# Patient Record
Sex: FEMALE | Race: WHITE | Employment: OTHER | URBAN - METROPOLITAN AREA
[De-identification: names, ages, dates, MRNs, and addresses within clinical notes are randomized per-mention and may not be internally consistent; named-entity substitution may affect disease eponyms.]

---

## 2022-04-17 ENCOUNTER — HOSPITAL ENCOUNTER (OUTPATIENT)
Age: 82
Discharge: HOME | End: 2022-04-17

## 2022-04-17 ENCOUNTER — HOSPITAL ENCOUNTER (EMERGENCY)
Age: 82
Discharge: ANOTHER HOSPITAL | End: 2022-04-17
Payer: MEDICARE

## 2022-04-17 ENCOUNTER — APPOINTMENT (OUTPATIENT)
Dept: CT IMAGING | Age: 82
End: 2022-04-17

## 2022-04-17 PROCEDURE — 99449 NTRPROF PH1/NTRNET/EHR 31/>: CPT | Performed by: PSYCHIATRY & NEUROLOGY

## 2022-04-17 NOTE — VIRTUAL HEALTH
1 hospitals Stroke and Vascular Neurology Consult for  [facility] Stroke Unit Stroke Alert through [address] @ 14:11  4/17/2022 4:43 PM  Pt Name: Jo Ann Storey  MRN: [id number]  [place]: 1940  Date of evaluation: 4/17/2022  Primary Care Physician: Shon Mas MD  Reason for Evaluation: Stroke Evaluation with Discussion with Ed or primary team with Telemedicine and stroke evaluation with Review of imaging and labs    Jo Ann Storey is a [de-identified] y.o. female who presents with last well 22:00 the night before. Lives with  who found her sleeping on couch since last night which is not usual for her. Increased expressive aphasia. Able to follow commands. Anxious. Post ictal vs new stroke. Not lying flat for scan and after telestroke evaluation, decision made for not sedating for the head ct prior to transport to [facility]    LKW: 22:00  NIH:  3    Allergies  has no allergies on file. Medications  Prior to Admission medications    Not on File    Scheduled Meds:  Continuous Infusions:  PRN Meds:.  Past Medical History   has no past medical history on file.   Social History  Social History     Socioeconomic History    Marital status:      Spouse name: Not on file    Number of children: Not on file    Years of education: Not on file    Highest education level: Not on file   Occupational History    Not on file   Tobacco Use    Smoking status: Not on file    Smokeless tobacco: Not on file   Substance and Sexual Activity    Alcohol use: Not on file    Drug use: Not on file    Sexual activity: Not on file   Other Topics Concern    Not on file   Social History Narrative    Not on file     Social Determinants of Health     Financial Resource Strain:     Difficulty of Paying Living Expenses: Not on file   Food Insecurity:     Worried About Running Out of Food in the Last Year: Not on file    Jahaira of Food in the Last Year: Not on file   Transportation Needs:     Lack of Transportation (Medical): Not on file    Lack of Transportation (Non-Medical): Not on file   Physical Activity:     Days of Exercise per Week: Not on file    Minutes of Exercise per Session: Not on file   Stress:     Feeling of Stress : Not on file   Social Connections:     Frequency of Communication with Friends and Family: Not on file    Frequency of Social Gatherings with Friends and Family: Not on file    Attends Anabaptist Services: Not on file    Active Member of [address] [place] [address] [employer] or Organizations: Not on file    Attends Club or Organization Meetings: Not on file    Marital Status: Not on file   Intimate Partner Violence:     Fear of Current or Ex-Partner: Not on file    Emotionally Abused: Not on file    Physically Abused: Not on file    Sexually Abused: Not on file   Housing Stability:     Unable to Pay for Housing in the Last Year: Not on file    Number of [place] in the Last Year: Not on file    Unstable Housing in the Last Year: Not on file     Family History  No family history on file. OBJECTIVE   BP Pulse    Resp  Rate N-normal  S-shallow  D-deep Monitor SpO2 O2    LPM BGL    Temp Pain   1250 122/68 135 14 N ST 98%        1305 121/74 131 18 N ST 99% RA                                                                                     Limited exam, expressive aphasia, moving all extremities    Pre-Morbid mRS: 1    Imaging:  Images were personally reviewed with VIZ. AI and PACS used to review images including:  CT brain without contrast: no hemorrhage. Noted atrophy      Assessment    [de-identified] y.o. female who presents with last well 22:00 the night before. Lives with  who found her sleeping on couch since last night which is not usual for her  Differential DDx:  1. Post ictal vs cva      Recommendations:  1. NIH 3  2. Recommend Inpatient Neurology Consult for further assessment and evaluation   3. cta head and neck  4.  Mri brain without anabell  5. Neurology workup and metabolic workup. . patient tachycardic      Discussed with Stroke team    At least 50 min of Telemedicine and time in conversation directly with ED staff and physician for the patient who is in imminent and life threatening deterioration without further treatment and evaluation. This Virtual Visit was conducted with patient's (and/or legal guardian's) consent, to provide telestroke consultation and necessary medical care. Time spent examining patient, reviewing the images personally, reviewing the chart, perform high complexity decision making and speaking with the nursing staff regarding recommendations      Florina Quinteros MD, MD   Stroke, Neurocritical Care And/or [address] [place] [address] Stroke [address] [address]  Electronically signed 4/17/2022 at 4:43 PM    Patient Location:  [facility] Stroke Unit    Provider Location ([facility]): [place], [place]    This virtual visit was conducted via interactive/real-time audio/video.

## 2022-04-17 NOTE — ED NOTES
..    4/17/2022 1:30 PM    Patient: Jimmy Dean, [de-identified] y.o., female Race: Caucasion  Patient Address: [address]  [address]  [address]  Incident Address:  [x]Same as patient address     [x] [facility]  [] [facility]  [] [facility] [place]   [] [facility]  [] [facility]  Patient Phone #: [phone]   Insurance: Payor: Cas Unger /  /  /   Eugenio Javier:  []  Yes   [x]  No  Emergency Contact: Extended Emergency Contact Information  Primary Emergency Contact: [address] Phone: [phone]  Relation: Spouse  MRN: [id number]  [facility]# [id number]  YOB: 1940  Primary Care Physician: Ramiro Dumas MD  Advance Directive: [x] Full Code   []DNR-CC   []DNR-CCA    MSU Crew: BOONE Garcia - CT Tech, DEEPAK Sow - Paramedic, Debi Antonio - KATY    Pt Transported To:  [] [place] [address]  [] [facility]  [] [facility]  [] [facility]  [x] [facility]  [] [facility]  [facility] [] [facility] [] [facility]    Was patient transported to closest facility? [x]Yes  []No (if No specify reason)   []   Patient/family request    []   Divert to specialist       Response Code   [] 2  [x] 3  []   Change  [] 2  [] 3   Transport Code   [x] 2  [] 3  []   Change  [] 2  [] 3     Mileage:  [address] [id number]   Total Miles 1.7     Call Received 4/17/2022 1218   Dispatched 4/17/2022 1222   Enroute 4/17/2022 1225   Arrived Scene 4/17/2022 1237   At Patient 4/17/2022 1241   Decision to Scan 4/17/2022 1245   Scan 4/17/2022 NA   Departed Scene 4/17/2022 [address] 4/17/2022 1306   Departed Hospital 4/17/2022 1330   In Service 4/17/2022 1330         Allergies  [] Updated/Reviewed by MSU  [x] Historical Data Only  [] Unavailable  has no allergies on file. Medications  [] Updated/Reviewed by MSU  [x] Historical Data Only  [] Unavailable  Prior to Admission medications    Not on File      Past Medical History  [] Updated/Reviewed by MSU  [x] Historical Data Only  [] Unavailable   has no past medical history on file.     Patient Weight:  110 lbs [x] Estimated   []   Stated          VITALS          Time BP Pulse   Resp  Rate N-normal  S-shallow  D-deep Monitor SpO2 O2    LPM BGL   Temp Pain   1250 122/68 135 14 N ST 98%      1305 121/74 131 18 N ST 99% RA                                                Pupils GCS   Time R L E  4 V  5 M  6 T  15   1250 4 4 4 4 6 14   1305 4 4 4 4 6 14                                  NIH -   record on all transports and Q15 min after tPA administration    NIHSS       Time 1245      1a. LOC - Arousal Status 0      1b. LOC - Questions 1      1c. LOC - Commands 0      2. Eye Movements (gaze) 0      3. Visual Fields 0      4. Facial Palsy 0      5a. Motor Left Arm 0      5b. Motor Right Arm 0      6a. Motor Left Leg 0      6b. Motor Right Leg 0      7. Limb Ataxia 0      8. Sensory 0      9. Language/Aphasia 2      10. Dysarthria 0      11. Neglect 0      TOTAL 3          Research:    RACE Score: Zero Time: 3508  StrokeVAN Score: NEG Time: 1500    Time Last Known Well: 2200 4/16/22    Narrative:    Dispatched to possible CVA per [facility]. Arrived to find Jo Ann Storey, [de-identified] y.o., female c/o altered mentation. [facility]. Upon MSU arrival pt found sitting on couch in her living room. Pt lives in a house with her .  reports pt sleeping on the couch last evening which is unusual for her. She went to sleep around 10pm. When  woke at 10 am this morning pt was noted to have confusion. Pt  poor historian to his wifes care. Pt found to be aphasic, will follow commands. NO facial droop, no extremity weakness, no gaze preference. Pt is also anxious. Pt's speech is clear but her words are bizzare responses to questions asked. Decision made to scan. Attempted multiple times to lie pt flat. She yelled and grab at crew. Asked pt if she had pain and she states no.  But after several attempts pt unable to lie flat yelling \"NO, NO, NO\"  at patient side via telemedicine unit and aware of inability to obtain CT scan. Decision made to transport without CT imaging. Patient received the following medications prior to MSU arrival: NONE  Patient has the following lines prior to MSU arrival: [facility]  Patient has the following airway placed prior to MSU arrival: NONE    Patient was transferred to cot via 2 person assist and secured with straps x3. Zoll ECG, SpO2, and NIBP applied and monitored throughout encounter. HOB maintained at 30 degrees. Patient was transferred to ambulance, cot secured in scan position. Cot secured in transport position. IV tPA inclusion/exclusion criteria reviewed with patient and physician. Candidate for IV tPA therapy? [] Yes   [x] No - due to the following exclusion criteria:    [] ICH   [] Taking anticoagulant -   [x] Beyond last time known well window  [] At or returned to baseline   [] Marked improvement of symptoms  [] No disabling symptoms  [] Other -     Patient [facility] . During transport patient rests comfortably. Cabin temp maintained at 70-72 °F throughout transport. Patient was transferred to CT scanner via 4 person sheet lift. Patient care handoff completed with ED RN at bedside. Imaging:  Images were obtained onboard the MSU including:  [x] CT brain without contrast - see results below:      No CT done due to pt unable to tolerate lying flat    Physical assessment performed:    Neuro: Altered mentation and aphasia, use of words inappropriately.    Resp: lungs clear to auscultation bilaterally, normal effort  Cardiac: regular rate, no murmur, 12 lead ECG shows NS with possible inverted T wave   Muscular/skeletal/peripheral vascular: no edema, no redness or tenderness in the calves, pulses present in 4 extremities   Abd/GI/: abd flat, soft, non tender  Skin: normal color, warm, dry      IV LINES   Time Size Site # Attempts Performed By   PTA 18g  RT AC Unknown LS# 4                     Total Amount of IV Fluids Infused: 10ml    MEDS / ELECTRICAL RX   Time Therapy Dosage Route Response Performed By                                                       TPA Administration    Time Bolus Dose Infusion Dose Waste   NA      NA          [] tPA dosing double checked by:             ACUTE STROKE DYSPHAGIA SCREEN              YES NO   1 GCS less than 13?         2 Facial Asymmetry or Weakness? 3 Tongue Asymmetry or Weakness? 4 Palatal Asymmetry or Weakness? 5 Signs of aspiration during 3oz water test?*                 If answers to questions 1-4 are NO proceed to 3oz water test               *Administer 3oz of water for sequential drinks, note any throat clearing, cough, or change in vocal quality immediately after and 1 minute following the swallow. If answers to questions 1-5 are NO proceed with ordered PO meds       POC LABS      TIME NA     Test (reference range)      FSBG ()      PT (11-13.5)      INR (0.8-1.1)      Na (138-146)      K (3.5-4.9)      Cl ()      iCa (1.2-1.32)      TCO2 (24-29)      Glu ()      BUN (8.0-26)      Crea (0.6-1. 3)      Hct (38-51)      Hb (12.0-17)      AnGap 10.0-20)                Assistance:   [x]    Fire - X33    []    Police    [x]    Other EMS (See Below)     #  [id number] Notification:    [address] -  [] Juan Lomas [address]  [] [facility]  [x] [facility]  [] [facility]  [] [facility] [] [facility] [] [facility] [] [facility]  [] [facility]     [x]    Med Channel:     []    Cell Phone     Med Control Orders Received:   [x] No  []  Yes:     Med Control Physician (if on line medical direction received)  -        Hospital Team Alert:   []    Trauma Alert    []    STEMI Alert         []    Stroke Alert    []    RACE Alert      Description Of Valuables: blue danie, eye glass's,     Patient Valuables:   [x]    With Patient    []    Not Received    [x]    ER / Lab     Call Outcome:   [x]    Transport to Facility    []    Care Transferred to [facility]    [] Cancelled    []    Patient Refusal    []                           Mobile Stroke Unit    Electronically signed by Juan R Hurtado RN on 4/17/22 at 1:30 PM EDT     Juan R Hurtado RN  04/17/22 5161

## 2022-09-06 ENCOUNTER — HOSPITAL ENCOUNTER (EMERGENCY)
Age: 82
Discharge: HOME | End: 2022-09-06
Attending: EMERGENCY MEDICINE
Payer: MEDICARE

## 2022-09-06 ENCOUNTER — APPOINTMENT (OUTPATIENT)
Dept: CT IMAGING | Age: 82
End: 2022-09-06
Payer: MEDICARE

## 2022-09-06 ENCOUNTER — APPOINTMENT (OUTPATIENT)
Dept: GENERAL RADIOLOGY | Age: 82
End: 2022-09-06
Payer: MEDICARE

## 2022-09-06 VITALS
HEIGHT: 60 IN | HEART RATE: 66 BPM | RESPIRATION RATE: 15 BRPM | BODY MASS INDEX: 27.48 KG/M2 | TEMPERATURE: 97.7 F | OXYGEN SATURATION: 95 % | WEIGHT: 140 LBS | DIASTOLIC BLOOD PRESSURE: 88 MMHG | SYSTOLIC BLOOD PRESSURE: 157 MMHG

## 2022-09-06 DIAGNOSIS — S00.83XA CONTUSION OF FOREHEAD, INITIAL ENCOUNTER: ICD-10-CM

## 2022-09-06 DIAGNOSIS — S09.90XA CLOSED HEAD INJURY, INITIAL ENCOUNTER: ICD-10-CM

## 2022-09-06 DIAGNOSIS — S00.31XA ABRASION OF NOSE, INITIAL ENCOUNTER: ICD-10-CM
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
## 2022-09-06 LAB
ABSOLUTE EOS #: 0.13 K/UL (ref 0–0.44)
ABSOLUTE IMMATURE GRANULOCYTE: 0.02 K/UL (ref 0–0.3)
ABSOLUTE LYMPH #: 1.8 K/UL (ref 1.1–3.7)
ABSOLUTE MONO #: 0.7 K/UL (ref 0.1–1.2)
ALBUMIN SERPL-MCNC: 4.1 G/DL (ref 3.5–5.2)
ALP BLD-CCNC: 139 U/L (ref 35–104)
ALT SERPL-CCNC: 31 U/L (ref 5–33)
ANION GAP SERPL CALCULATED.3IONS-SCNC: 10 MMOL/L (ref 9–17)
AST SERPL-CCNC: 20 U/L
BASOPHILS # BLD: 0 % (ref 0–2)
BASOPHILS ABSOLUTE: <0.03 K/UL (ref 0–0.2)
BILIRUB SERPL-MCNC: 0.4 MG/DL (ref 0.3–1.2)
BILIRUBIN URINE: NEGATIVE
BUN BLDV-MCNC: 16 MG/DL (ref 8–23)
BUN/CREAT BLD: 24 (ref 9–20)
CALCIUM SERPL-MCNC: 9.7 MG/DL (ref 8.6–10.4)
CHLORIDE BLD-SCNC: 108 MMOL/L (ref 98–107)
CO2: 29 MMOL/L (ref 20–31)
COLOR: YELLOW
CREAT SERPL-MCNC: 0.68 MG/DL (ref 0.5–0.9)
EOSINOPHILS RELATIVE PERCENT: 2 % (ref 1–4)
EPITHELIAL CELLS UA: ABNORMAL /HPF (ref 0–5)
GFR AFRICAN AMERICAN: >60 ML/MIN
GFR NON-AFRICAN AMERICAN: >60 ML/MIN
GFR SERPL CREATININE-BSD FRML MDRD: ABNORMAL ML/MIN/{1.73_M2}
GLUCOSE BLD-MCNC: 86 MG/DL (ref 70–99)
GLUCOSE URINE: NEGATIVE
HCT VFR BLD CALC: 40.5 % (ref 36.3–47.1)
HEMOGLOBIN: 13.2 G/DL (ref 11.9–15.1)
IMMATURE GRANULOCYTES: 0 %
KETONES, URINE: NEGATIVE
LEUKOCYTE ESTERASE, URINE: ABNORMAL
LYMPHOCYTES # BLD: 20 % (ref 24–43)
MCH RBC QN AUTO: 29 PG (ref 25.2–33.5)
MCHC RBC AUTO-ENTMCNC: 32.6 G/DL (ref 28.4–34.8)
MCV RBC AUTO: 89 FL (ref 82.6–102.9)
MONOCYTES # BLD: 8 % (ref 3–12)
NITRITE, URINE: NEGATIVE
NRBC AUTOMATED: 0 PER 100 WBC
PDW BLD-RTO: 12.6 % (ref 11.8–14.4)
PH UA: 6 (ref 5–8)
PLATELET # BLD: 257 K/UL (ref 138–453)
PMV BLD AUTO: 9.5 FL (ref 8.1–13.5)
POTASSIUM SERPL-SCNC: 4.1 MMOL/L (ref 3.7–5.3)
PRO-BNP: 1967 PG/ML
PROTEIN UA: NEGATIVE
RBC # BLD: 4.55 M/UL (ref 3.95–5.11)
RBC UA: ABNORMAL /HPF (ref 0–2)
SEG NEUTROPHILS: 70 % (ref 36–65)
SEGMENTED NEUTROPHILS ABSOLUTE COUNT: 6.16 K/UL (ref 1.5–8.1)
SODIUM BLD-SCNC: 147 MMOL/L (ref 135–144)
SPECIFIC GRAVITY UA: 1.01 (ref 1–1.03)
TOTAL PROTEIN: 7.1 G/DL (ref 6.4–8.3)
TROPONIN, HIGH SENSITIVITY: 12 NG/L (ref 0–14)
TROPONIN, HIGH SENSITIVITY: 16 NG/L (ref 0–14)
TURBIDITY: CLEAR
URINE HGB: ABNORMAL
UROBILINOGEN, URINE: NORMAL
WBC # BLD: 8.8 K/UL (ref 3.5–11.3)
WBC UA: ABNORMAL /HPF (ref 0–5)

## 2022-09-06 PROCEDURE — 85025 COMPLETE CBC W/AUTO DIFF WBC: CPT

## 2022-09-06 PROCEDURE — 71045 X-RAY EXAM CHEST 1 VIEW: CPT

## 2022-09-06 PROCEDURE — 80053 COMPREHEN METABOLIC PANEL: CPT

## 2022-09-06 PROCEDURE — 83880 ASSAY OF NATRIURETIC PEPTIDE: CPT

## 2022-09-06 PROCEDURE — 72125 CT NECK SPINE W/O DYE: CPT

## 2022-09-06 PROCEDURE — 84484 ASSAY OF TROPONIN QUANT: CPT

## 2022-09-06 PROCEDURE — 81001 URINALYSIS AUTO W/SCOPE: CPT

## 2022-09-06 PROCEDURE — 99285 EMERGENCY DEPT VISIT HI MDM: CPT

## 2022-09-06 PROCEDURE — 72170 X-RAY EXAM OF PELVIS: CPT

## 2022-09-06 PROCEDURE — 70450 CT HEAD/BRAIN W/O DYE: CPT

## 2022-09-06 PROCEDURE — 87086 URINE CULTURE/COLONY COUNT: CPT

## 2022-09-06 PROCEDURE — 93005 ELECTROCARDIOGRAM TRACING: CPT | Performed by: EMERGENCY MEDICINE

## 2022-09-06 PROCEDURE — 70486 CT MAXILLOFACIAL W/O DYE: CPT

## 2022-09-06 RX ORDER — TETANUS AND DIPHTHERIA TOXOIDS ADSORBED 2; 2 [LF]/.5ML; [LF]/.5ML
0.5 INJECTION INTRAMUSCULAR ONCE
Status: DISCONTINUED | OUTPATIENT
Start: 2022-09-06 | End: 2022-09-06 | Stop reason: HOSPADM

## 2022-09-06 ASSESSMENT — ENCOUNTER SYMPTOMS: COLOR CHANGE: 1

## 2022-09-06 ASSESSMENT — PAIN - FUNCTIONAL ASSESSMENT: PAIN_FUNCTIONAL_ASSESSMENT: 0-10

## 2022-09-06 ASSESSMENT — PAIN SCALES - GENERAL: PAINLEVEL_OUTOF10: 6

## 2022-09-06 NOTE — ED PROVIDER NOTES
[facility]  Emergency Department Encounter     Pt Name: Gerardo Marc  MRN: [id number]  [place] 1940  Date of evaluation: 9/6/22  PCP:  Zakia Moore MD    [address] [place] [address]       Chief Complaint   Patient presents with    Fall     Hit her head       HISTORY OF PRESENT ILLNESS  (Location/Symptom, Timing/Onset, Context/Setting, Quality, Duration, Modifying Factors, Severity.)    Gerardo Marc is a 81 y.o. female who presents with fall. Patient has a history of recent stroke and is present with family member. Family member states that she felt may be around 530 or 6 AM this morning. She lives in assisted care with her . Patient cannot provide me any details about why or how she fell or if she is symptomatic before she fell. Family is unsure of whether or not she is on any blood thinning medication. Patient's complaints is mostly of left-sided neck pain. 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 Is of note during triage patient verbalized to nurse that she was bending over to pick something up when she accidentally fell forward and hit her face on the carpeted floor     [facility] 61 / SURGICAL / SOCIAL / FAMILY HISTORY    has no past medical history on file. CVA, HTN     has no past surgical history on file.     Social History     Socioeconomic History    Marital status:      Spouse name: Not on file    Number of children: Not on file    Years of education: Not on file    Highest education level: Not on file   Occupational History    Not on file   Tobacco Use    Smoking status: Never    Smokeless tobacco: Never   Substance and Sexual Activity    Alcohol use: Not Currently    Drug use: Never    Sexual activity: Not on file   Other Topics Concern    Not on file   Social History Narrative    Not on file     Social Determinants of Health     Financial Resource Strain: Not on file   Food Insecurity: Not on file   Transportation Needs: Not on file   Physical Activity: Not on file   Stress: Not on file   Social Connections: Not on file   Intimate Partner Violence: Not on file   Housing Stability: Not on file       History reviewed. No pertinent family history. Allergies:    Patient has no known allergies. Home Medications:  Prior to Admission medications    Not on File       REVIEW OF SYSTEMS    (2-9 systems for level 4, 10 or more for level 5)    Review of Systems   Unable to perform ROS: Dementia   Skin:  Positive for color change and wound. PHYSICAL EXAM   (up to 7 for level 4, 8 or more for level 5)    VITALS:   Vitals:    09/06/22 1059   BP: (!) 157/88   Pulse: 66   Resp: 15   Temp: 97.7 °F (36.5 °C)   TempSrc: Oral   SpO2: 95%   Weight: 140 lb (63.5 kg)   Height: 5' (1.524 m)       Physical Exam  Vitals and nursing note reviewed. Constitutional:       General: She is not in acute distress. Appearance: She is well-developed. She is not diaphoretic. HENT:      Head: Normocephalic. Abrasion and contusion present. No laceration. Right Ear: External ear normal.      Left Ear: External ear normal.   Eyes:      Extraocular Movements: Extraocular movements intact. Conjunctiva/sclera: Conjunctivae normal.      Pupils: Pupils are equal, round, and reactive to light. Neck:      Trachea: Trachea and phonation normal.   Cardiovascular:      Rate and Rhythm: Normal rate and regular rhythm. Heart sounds: Normal heart sounds. Pulmonary:      Effort: Pulmonary effort is normal. No respiratory distress. Breath sounds: Normal breath sounds. No wheezing, rhonchi or rales. Abdominal:      General: There is no distension. Palpations: Abdomen is soft. Tenderness: There is no abdominal tenderness. There is no guarding or rebound. Comments: Pelvis stable to rock   Musculoskeletal:         General: Normal range of motion.       Cervical back: Full passive range of motion without pain, normal range of motion and neck supple. Tenderness present. Muscular tenderness present. No spinous process tenderness. Skin:     General: Skin is warm and dry. Neurological:      General: No focal deficit present. Mental Status: She is alert.    Psychiatric:         Behavior: Behavior normal.       DIFFERENTIAL  DIAGNOSIS   PLAN (LABS / IMAGING / EKG):  Orders Placed This Encounter   Procedures    Culture, Urine    XR CHEST 1 VIEW    XR PELVIS (1-2 VIEWS)    CT HEAD WO CONTRAST    CT FACIAL BONES WO CONTRAST    CT CERVICAL SPINE WO CONTRAST    CBC with Auto Differential    Comprehensive Metabolic Panel w/ Reflex to MG    Troponin    Brain Natriuretic Peptide    Urinalysis with Microscopic    Troponin    Telemetry monitoring - continuous duration    EKG 12 Lead    Insert peripheral IV    Fall precautions       MEDICATIONS ORDERED:  Orders Placed This Encounter   Medications    diptheria-tetanus toxoids (DECAVAC) 2-2 LF/0.5ML injection 0.5 mL       DIAGNOSTIC RESULTS / EMERGENCYDEPARTMENT COURSE / MDM   LABS:  Labs Reviewed   CBC WITH AUTO DIFFERENTIAL - Abnormal; Notable for the following components:       Result Value    Seg Neutrophils 70 (*)     Lymphocytes 20 (*)     All other components within normal limits   COMPREHENSIVE METABOLIC PANEL W/ REFLEX TO MG FOR LOW K - Abnormal; Notable for the following components:    Bun/Cre Ratio 24 (*)     Sodium 147 (*)     Chloride 108 (*)     Alkaline Phosphatase 139 (*)     All other components within normal limits   TROPONIN - Abnormal; Notable for the following components:    Troponin, High Sensitivity 16 (*)     All other components within normal limits   BRAIN NATRIURETIC PEPTIDE - Abnormal; Notable for the following components:    Pro-BNP 1,967 (*)     All other components within normal limits   URINALYSIS WITH MICROSCOPIC - Abnormal; Notable for the following components:    Urine Hgb 2+ (*)     Leukocyte Esterase, Urine SMALL (*)     All other components within normal limits   CULTURE, URINE   TROPONIN       RADIOLOGY:  XR PELVIS (1-2 VIEWS)    Result Date: 9/6/2022  EXAMINATION: ONE XRAY VIEW OF THE PELVIS 9/6/2022 12:31 pm COMPARISON: None. HISTORY: ORDERING SYSTEM PROVIDED HISTORY: fall TECHNOLOGIST PROVIDED HISTORY: fall Reason for Exam: Fall today with hip pain chest pains. FINDINGS: No acute fracture is seen. Mild to moderate bilateral hip osteoarthritis. No osteonecrosis. 1.  No acute fracture or dislocation. 2.  Mild to moderate bilateral hip osteoarthritis. CT HEAD WO CONTRAST    Result Date: 9/6/2022  EXAMINATION: CT OF THE HEAD WITHOUT CONTRAST; CT OF THE FACE WITHOUT CONTRAST 9/6/2022 11:53 am TECHNIQUE: CT of the head was performed without the administration of intravenous contrast. Automated exposure control, iterative reconstruction, and/or weight based adjustment of the mA/kV was utilized to reduce the radiation dose to as low as reasonably achievable.; CT of the face was performed without the administration of intravenous contrast. Multiplanar reformatted images are provided for review. Automated exposure control, iterative reconstruction, and/or weight based adjustment of the mA/kV was utilized to reduce the radiation dose to as low as reasonably achievable. COMPARISON: None. HISTORY: ORDERING SYSTEM PROVIDED HISTORY: fall facial trauma TECHNOLOGIST PROVIDED HISTORY: fall facial trauma Decision Support Exception - unselect if not a suspected or confirmed emergency medical condition->Emergency Medical Condition (MA); ORDERING SYSTEM PROVIDED HISTORY: fall facial trauma TECHNOLOGIST PROVIDED HISTORY: fall facial trauma Decision Support Exception - unselect if not a suspected or confirmed emergency medical condition->Emergency Medical Condition (MA) FINDINGS: CT HEAD: BRAIN/VENTRICLES: There is no acute intracranial hemorrhage, mass effect or midline shift. No abnormal extra-axial fluid collection.   The gray-white differentiation is maintained without evidence of an acute infarct. There is no evidence of hydrocephalus. Severe chronic microvascular ischemic change. CT FACIAL BONES: FACIAL BONES: The maxilla, pterygoid plates and zygomatic arches are intact. The mandible is intact. The mandibular condyles are normally situated. The nasal bones and maxillary nasal processes are intact. ORBITS: The globes appear intact. The extraocular muscles, optic nerve sheath complexes and lacrimal glands appear unremarkable. No retrobulbar hematoma or mass is seen. The orbital walls and rims are intact. SINUSES/MASTOIDS: Left maxillary mucous retention cyst.  Air-fluid level in the sphenoid sinus. SOFT TISSUES: Subcutaneous hematoma in left frontal region. No acute intracranial abnormality. No acute traumatic injury of the facial bones. Air-fluid level in the sphenoid sinus. Recommend clinical correlation for acute sinusitis. CT FACIAL BONES WO CONTRAST    Result Date: 9/6/2022  EXAMINATION: CT OF THE HEAD WITHOUT CONTRAST; CT OF THE FACE WITHOUT CONTRAST 9/6/2022 11:53 am TECHNIQUE: CT of the head was performed without the administration of intravenous contrast. Automated exposure control, iterative reconstruction, and/or weight based adjustment of the mA/kV was utilized to reduce the radiation dose to as low as reasonably achievable.; CT of the face was performed without the administration of intravenous contrast. Multiplanar reformatted images are provided for review. Automated exposure control, iterative reconstruction, and/or weight based adjustment of the mA/kV was utilized to reduce the radiation dose to as low as reasonably achievable. COMPARISON: None.  HISTORY: ORDERING SYSTEM PROVIDED HISTORY: fall facial trauma TECHNOLOGIST PROVIDED HISTORY: fall facial trauma Decision Support Exception - unselect if not a suspected or confirmed emergency medical condition->Emergency Medical Condition (MA); ORDERING SYSTEM PROVIDED HISTORY: fall facial trauma TECHNOLOGIST PROVIDED HISTORY: fall facial trauma Decision Support Exception - unselect if not a suspected or confirmed emergency medical condition->Emergency Medical Condition (MA) FINDINGS: CT HEAD: BRAIN/VENTRICLES: There is no acute intracranial hemorrhage, mass effect or midline shift. No abnormal extra-axial fluid collection. The gray-white differentiation is maintained without evidence of an acute infarct. There is no evidence of hydrocephalus. Severe chronic microvascular ischemic change. CT FACIAL BONES: FACIAL BONES: The maxilla, pterygoid plates and zygomatic arches are intact. The mandible is intact. The mandibular condyles are normally situated. The nasal bones and maxillary nasal processes are intact. ORBITS: The globes appear intact. The extraocular muscles, optic nerve sheath complexes and lacrimal glands appear unremarkable. No retrobulbar hematoma or mass is seen. The orbital walls and rims are intact. SINUSES/MASTOIDS: Left maxillary mucous retention cyst.  Air-fluid level in the sphenoid sinus. SOFT TISSUES: Subcutaneous hematoma in left frontal region. No acute intracranial abnormality. No acute traumatic injury of the facial bones. Air-fluid level in the sphenoid sinus. Recommend clinical correlation for acute sinusitis. CT CERVICAL SPINE WO CONTRAST    Result Date: 9/6/2022  EXAMINATION: CT OF THE CERVICAL SPINE WITHOUT CONTRAST 9/6/2022 11:53 am TECHNIQUE: CT of the cervical spine was performed without the administration of intravenous contrast. Multiplanar reformatted images are provided for review. Automated exposure control, iterative reconstruction, and/or weight based adjustment of the mA/kV was utilized to reduce the radiation dose to as low as reasonably achievable. COMPARISON: None.  HISTORY: ORDERING SYSTEM PROVIDED HISTORY: fall facial trauma TECHNOLOGIST PROVIDED HISTORY: fall facial trauma Decision Support Exception - unselect if not a suspected or confirmed emergency medical condition->Emergency Medical Condition (MA) FINDINGS: BONES/ALIGNMENT: There is no acute fracture or traumatic malalignment. Mild convex left curvature. DEGENERATIVE CHANGES: Moderate-severe multilevel degenerative changes and disc space narrowing, more C4-T1. disc osteophyte complex change and multilevel facet arthropathy with mild-moderate impingement on the canal and neural foramina. No severe impingement suspected. SOFT TISSUES: There is no prevertebral soft tissue swelling. Visualized lung apices clear. No thyroid abnormality. Fluid level posterior seen oil sinus. Probable polyp or retention cyst left maxillary antrum. Carotid artery calcifications, more on the right. No acute abnormality of the cervical spine. Multilevel degenerative changes/DDD, with additional findings as above. XR CHEST 1 VIEW    Result Date: 9/6/2022  EXAMINATION: ONE XRAY VIEW OF THE CHEST 9/6/2022 12:31 pm COMPARISON: None. HISTORY: ORDERING SYSTEM PROVIDED HISTORY: fall TECHNOLOGIST PROVIDED HISTORY: fall Reason for Exam: Fall today with hip pain chest pains. FINDINGS: Normal cardiomediastinal silhouette. No pneumothorax or pleural effusion. No acute airspace infiltrate. Loop recorder device projects over the left lower chest wall. No acute fracture is evident     No acute cardiopulmonary findings. EKG    EKG Interpretation    Interpreted by emergency department physician    Rhythm: normal sinus   Rate: normal  Axis: left  Ectopy: none  Conduction: right bundle branch block (complete)  ST Segments: depression in  v2, v3, I, and aVl  T Waves: normal  Q Waves: none    Clinical Impression: non-specific EKG.  No prior for comparison    All EKG's are interpreted by the Emergency Department Physician whoeither signs or Co-signs this chart in the absence of a cardiologist.    EMERGENCY DEPARTMENT COURSE:  ED Course as of 09/06/22 1545   Tue Sep 06, 2022   1503 [address] [place] [address] [AO] 1237 CT HEAD WO CONTRAST [AO]   1238 CT CERVICAL SPINE WO CONTRAST [AO]   1245 XR CHEST 1 VIEW [AO]   1259 XR PELVIS (1-2 VIEWS) [AO]   1317 CBC with Auto Differential(!):    WBC 8.8   RBC 4.55   Hemoglobin Quant 13.2   Hematocrit 40.5   MCV 89.0   MCH 29.0   MCHC 32.6   RDW 12.6   Platelet Count 470   MPV 9.5   NRBC Automated 0.0   Seg Neutrophils 70(!)   Lymphocytes 20(!)   Monocytes 8   Eosinophils % 2   Basophils 0   Immature Granulocytes 0   Segs Absolute 6.16   Absolute Lymph # 1.80   Absolute Mono # 0.70   Absolute Eos # 0.13   Basophils Absolute <0.03   Absolute Immature Granulocyte 0.02 [AO]   1328 Urinalysis with Microscopic(!):    Color, UA Yellow   Turbidity UA Clear   Glucose, UA NEGATIVE   Bilirubin, Urine NEGATIVE   Ketones, Urine NEGATIVE   Specific [place], UA 1.008   Urine Hgb 2+(!)   pH, UA 6.0   Protein, UA NEGATIVE   Urobilinogen, Urine Normal   Nitrite, Urine NEGATIVE   Leukocyte Esterase, Urine SMALL(!)   WBC, UA 5 TO 10   RBC, UA 10 TO 20   Epithelial Cells, UA 5 TO 10 [AO]   1349 Comprehensive Metabolic Panel w/ Reflex to MG(!):    Glucose, Random 86   BUN,BUNPL 16   Creatinine 0.68   Bun/Cre Ratio 24(!)   CALCIUM, SERUM, 639849 9.7   Sodium 147(!)   Potassium 4.1   Chloride 108(!)   CO2 29   Anion Gap 10   Alk Phos 139(!)   ALT 31   AST 20   Bilirubin 0.4   Total Protein 7.1   Albumin 4.1   GFR Non- >60   GFR  >60   GFR Comment      [AO]   1349 Brain Natriuretic Peptide(!):    Pro-BNP 1,967(!) [AO]   1349 Troponin, High Sensitivity(!): 16 [AO]   1542 Troponin, High Sensitivity: 12 [AO]      ED Course User Index  [AO] DO KARINA Francisco     Amount and/or Complexity of Data Reviewed  Clinical lab tests: ordered and reviewed  Tests in the radiology section of CPT®: ordered and reviewed  Review and summarize past medical records: yes  Independent visualization of images, tracings, or specimens: yes    Patient Progress  Patient progress: stable      PROCEDURES:  Procedures     CONSULTS:  None    CRITICAL CARE:  NONE    FINAL IMPRESSION     1. Fall, initial encounter    2. Closed head injury, initial encounter    3. Abrasion of nose, initial encounter    4. Contusion of forehead, initial encounter        DISPOSITION / PLAN   DISPOSITION Decision To Discharge 09/06/2022 03:43:11 PM      Evaluation and treatment course in the ED, and plan of care upon discharge was discussed in length with the patient. Patient had no further questions prior to being discharged and was instructed to return to the ED for new or worsening symptoms. Any changes to existing medications or new prescriptions were reviewed with patient and they expressed understanding of how to correctly take their medications and the possible side effects. PATIENT REFERRED TO:  Andrea Pedroza MD  [address] [place] [address] [id number] [place] [phone]    As needed, If symptoms worsen    DISCHARGE MEDICATIONS:  New Prescriptions    No medications on file       Prudence Dwyer DO  Emergency Medicine Physician    (Please note that portions of this note were completed with a voice recognition program.  Efforts were made to edit the dictations but occasionally words are mis-transcribed.)       Wilian Wilkes 1721,   09/06/22 2187

## 2022-09-06 NOTE — ED NOTES
Called cell phone without answer. Pt's call button was left behind. Will be located at nurse's station.       Alina Dale RN  09/06/22 [id number]

## 2022-09-07 LAB
CULTURE: NORMAL
EKG ATRIAL RATE: 62 BPM
EKG P AXIS: 41 DEGREES
EKG P-R INTERVAL: 162 MS
EKG Q-T INTERVAL: 482 MS
EKG QRS DURATION: 124 MS
EKG QTC CALCULATION (BAZETT): 489 MS
EKG R AXIS: -55 DEGREES
EKG T AXIS: 36 DEGREES
EKG VENTRICULAR RATE: 62 BPM
SPECIMEN DESCRIPTION: NORMAL

## 2022-09-07 PROCEDURE — 93010 ELECTROCARDIOGRAM REPORT: CPT | Performed by: INTERNAL MEDICINE

## 2025-04-27 ENCOUNTER — OFFICE VISIT (OUTPATIENT)
Age: 85
End: 2025-04-27

## 2025-04-27 VITALS
RESPIRATION RATE: 16 BRPM | BODY MASS INDEX: 25.52 KG/M2 | TEMPERATURE: 98.2 F | SYSTOLIC BLOOD PRESSURE: 123 MMHG | HEART RATE: 73 BPM | DIASTOLIC BLOOD PRESSURE: 76 MMHG | OXYGEN SATURATION: 96 % | WEIGHT: 144 LBS | HEIGHT: 63 IN

## 2025-04-27 DIAGNOSIS — H61.21 IMPACTED CERUMEN OF RIGHT EAR: Primary | ICD-10-CM

## 2025-04-27 DIAGNOSIS — H60.501 ACUTE OTITIS EXTERNA OF RIGHT EAR, UNSPECIFIED TYPE: ICD-10-CM

## 2025-04-27 RX ORDER — OFLOXACIN 3 MG/ML
10 SOLUTION AURICULAR (OTIC) DAILY
Qty: 10 ML | Refills: 0 | Status: SHIPPED | OUTPATIENT
Start: 2025-04-27 | End: 2025-05-04